# Patient Record
(demographics unavailable — no encounter records)

---

## 2025-05-01 NOTE — PHYSICAL EXAM
[de-identified] : Examination of the left foot and ankle is as follows: Inspection: swelling, ecchymosis of foot and ankle, moderate swelling of dorsal foot and lateral ankle, but no abrasion, laceration, no erythema Palpation: lateral malleolus tenderness, lateral ligament tenderness ROM: plantarflexion 20 degrees, inversion 15 degrees, eversion 10 degrees, dorsiflexion 10 degrees Strength: dorsiflexion 4/5, plantar flexion 4/5, inversion 4/5, eversion 4/5, EHL 5/5, FHL 5/5 Vascular: dorsalis pedis pulse: 2+, posterior tibialis pulse: 2+ Neuro: Sensation present to light touch in all distributions Gait: antalgic, LLE short leg splint, patient use axillary crutches for ambulation   X-rays of the left ankle is as follows: Ankle 3 view AP/Lateral/Oblique: nondisplaced, lateral malleolus fracture below the level of the syndesmosis, mortise intact without medial clear space widening
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
## 2025-06-12 NOTE — PHYSICAL EXAM
[de-identified] : Examination of the left foot and ankle is as follows: Inspection: swelling, mild swelling of dorsal foot and lateral ankle, but no abrasion, laceration, no erythema, no ecchymosis Palpation: mild lateral malleolus tenderness, mild lateral ligament tenderness ROM: plantarflexion 30 degrees, inversion 20 degrees, eversion 15 degrees, dorsiflexion 15 degrees Strength: dorsiflexion 4/5, plantar flexion 4/5, inversion 4/5, eversion 4/5, EHL 5/5, FHL 5/5 Vascular: dorsalis pedis pulse: 2+, posterior tibialis pulse: 2+ Neuro: Sensation present to light touch in all distributions Gait: non-antalgic, LLE lace up ankle support brace, patient ambulates without assistive devices   X-rays of the left ankle is as follows: Ankle 3 view AP/Lateral/Oblique: nondisplaced, lateral malleolus fracture below the level of the syndesmosis with increased callous formation, mortise intact without medial clear space widening